# Patient Record
Sex: FEMALE | Race: WHITE
[De-identification: names, ages, dates, MRNs, and addresses within clinical notes are randomized per-mention and may not be internally consistent; named-entity substitution may affect disease eponyms.]

---

## 2018-02-26 ENCOUNTER — HOSPITAL ENCOUNTER (OUTPATIENT)
Dept: HOSPITAL 92 - BICMAMMO | Age: 48
Discharge: HOME | End: 2018-02-26
Attending: FAMILY MEDICINE
Payer: COMMERCIAL

## 2018-02-26 DIAGNOSIS — Z80.3: ICD-10-CM

## 2018-02-26 DIAGNOSIS — Z12.31: Primary | ICD-10-CM

## 2018-02-26 PROCEDURE — 77063 BREAST TOMOSYNTHESIS BI: CPT

## 2018-02-26 PROCEDURE — 77067 SCR MAMMO BI INCL CAD: CPT

## 2019-04-11 ENCOUNTER — HOSPITAL ENCOUNTER (OUTPATIENT)
Dept: HOSPITAL 92 - BICMAMMO | Age: 49
Discharge: HOME | End: 2019-04-11
Attending: FAMILY MEDICINE
Payer: COMMERCIAL

## 2019-04-11 DIAGNOSIS — Z12.31: Primary | ICD-10-CM

## 2019-04-11 DIAGNOSIS — N63.10: ICD-10-CM

## 2019-04-11 DIAGNOSIS — Z80.3: ICD-10-CM

## 2019-04-11 PROCEDURE — 77067 SCR MAMMO BI INCL CAD: CPT

## 2019-04-11 PROCEDURE — 77063 BREAST TOMOSYNTHESIS BI: CPT

## 2019-04-11 NOTE — MMO
Bilateral MAMMO Bilat Screen [facility].

 

CLINICAL HISTORY:

Patient is 49 years old and is seen for screening. The patient has the following

family history of breast cancer:  paternal grandmother, at age 80 and mother, at

age 34.  The patient has no personal history of cancer.

 

VIEWS:

The views performed were:  bilateral craniocaudal with tomosynthesis and

bilateral mediolateral oblique with tomosynthesis.

 

FILMS COMPARED:

The present examination has been compared to prior imaging studies performed at

[facility] on 10/11/2012, 04/17/2014, 07/28/2015, 08/05/2016 and 02/26/2018.

 

MAMMOGRAM FINDINGS:

There are scattered fibroglandular densities.

 

There is a new mass measuring 7 x 12 mm with circumscribed margins seen in the

middle region of the right breast at 12 o'clock.

 

In the left breast, there are no suspicious masses, calcifications or areas of

architectural distortion.

 

IMPRESSION:

NEW MASS IN THE RIGHT BREAST REQUIRES ADDITIONAL EVALUATION. ADDITIONAL

PROJECTIONS ARE RECOMMENDED. AN ULTRASOUND EXAM IS RECOMMENDED. ADDITIONAL

IMAGING.

 

THE RESULTS OF THIS EXAM WERE SENT TO THE PATIENT.

 

ACR BI-RADS Category 0 - Incomplete:  Need additional imaging evaluation. [facility] will notify the patient of the need for additional imaging services.

 

MAMMOGRAPHY NOTE:

 1. A negative mammogram report should not delay a biopsy if a dominant of

 clinically suspicious mass is present.

 2. Approximately 10% to 15% of breast cancers are not detected by

 mammography.

 3. Adenosis and dense breasts may obscure an underlying neoplasm.

## 2019-04-17 ENCOUNTER — HOSPITAL ENCOUNTER (OUTPATIENT)
Dept: HOSPITAL 92 - BICMAMMO | Age: 49
Discharge: HOME | End: 2019-04-17
Attending: FAMILY MEDICINE
Payer: COMMERCIAL

## 2019-04-17 DIAGNOSIS — N60.01: ICD-10-CM

## 2019-04-17 DIAGNOSIS — N63.10: Primary | ICD-10-CM

## 2019-04-17 PROCEDURE — G0279 TOMOSYNTHESIS, MAMMO: HCPCS

## 2019-04-17 NOTE — ULT
DIAGNOSTIC RIGHT BREAST ULTRASOUND:

 

INDICATIONS:

Mammographic mass.  Follow-up imaging.

 

TECHNIQUE:

Exam is performed in conjunction with diagnostic mammography.

 

FINDINGS:

There is a circumscribed, oval, anechoic focus with through transmission of sound at the 12 o'clock l
ocation of the right breast, which does correspond to a mammographic mass.  This measures 8 mm.  Dopp
ler evaluation is performed, which does not reveal evidence of internal flow.

 

IMPRESSION:

BI-RADS 2-Benign findings.  Benign cyst is present at the 12 o'clock location of the right breast, ac
counting for mammographic mass.

 

Recommend follow-up annual screening mammography.

 

POS: OFF

## 2020-06-09 ENCOUNTER — HOSPITAL ENCOUNTER (OUTPATIENT)
Dept: HOSPITAL 92 - LABBT | Age: 50
Discharge: HOME | End: 2020-06-09
Attending: ORTHOPAEDIC SURGERY
Payer: COMMERCIAL

## 2020-06-09 DIAGNOSIS — Z11.59: ICD-10-CM

## 2020-06-09 DIAGNOSIS — M75.02: ICD-10-CM

## 2020-06-09 DIAGNOSIS — Z01.818: Primary | ICD-10-CM

## 2020-06-09 LAB
BASOPHILS # BLD AUTO: 0 THOU/UL (ref 0–0.2)
BASOPHILS NFR BLD AUTO: 0.6 % (ref 0–1)
EOSINOPHIL # BLD AUTO: 0.1 THOU/UL (ref 0–0.7)
EOSINOPHIL NFR BLD AUTO: 1.8 % (ref 0–10)
HGB BLD-MCNC: 13.7 G/DL (ref 12–16)
LYMPHOCYTES # BLD: 1.9 THOU/UL (ref 1.2–3.4)
LYMPHOCYTES NFR BLD AUTO: 30.3 % (ref 21–51)
MCH RBC QN AUTO: 30.5 PG (ref 27–31)
MCV RBC AUTO: 91.1 FL (ref 78–98)
MONOCYTES # BLD AUTO: 0.5 THOU/UL (ref 0.11–0.59)
MONOCYTES NFR BLD AUTO: 7.9 % (ref 0–10)
NEUTROPHILS # BLD AUTO: 3.8 THOU/UL (ref 1.4–6.5)
NEUTROPHILS NFR BLD AUTO: 59.5 % (ref 42–75)
PLATELET # BLD AUTO: 217 THOU/UL (ref 130–400)
RBC # BLD AUTO: 4.49 MILL/UL (ref 4.2–5.4)
WBC # BLD AUTO: 6.3 THOU/UL (ref 4.8–10.8)

## 2020-06-09 PROCEDURE — 85025 COMPLETE CBC W/AUTO DIFF WBC: CPT

## 2020-06-09 PROCEDURE — U0003 INFECTIOUS AGENT DETECTION BY NUCLEIC ACID (DNA OR RNA); SEVERE ACUTE RESPIRATORY SYNDROME CORONAVIRUS 2 (SARS-COV-2) (CORONAVIRUS DISEASE [COVID-19]), AMPLIFIED PROBE TECHNIQUE, MAKING USE OF HIGH THROUGHPUT TECHNOLOGIES AS DESCRIBED BY CMS-2020-01-R: HCPCS

## 2020-06-09 PROCEDURE — 87635 SARS-COV-2 COVID-19 AMP PRB: CPT

## 2020-06-11 ENCOUNTER — HOSPITAL ENCOUNTER (OUTPATIENT)
Dept: HOSPITAL 92 - SDC | Age: 50
Discharge: HOME | End: 2020-06-11
Attending: ORTHOPAEDIC SURGERY
Payer: COMMERCIAL

## 2020-06-11 DIAGNOSIS — M75.41: ICD-10-CM

## 2020-06-11 DIAGNOSIS — Z79.899: ICD-10-CM

## 2020-06-11 DIAGNOSIS — M75.02: Primary | ICD-10-CM

## 2020-06-11 DIAGNOSIS — G89.18: ICD-10-CM

## 2020-06-11 DIAGNOSIS — Z88.2: ICD-10-CM

## 2020-06-11 NOTE — OP
DATE OF PROCEDURE:  06/11/2020



PREOPERATIVE DIAGNOSES:  Left shoulder adhesive capsulitis, left shoulder

subacromial impingement. 



POSTOPERATIVE DIAGNOSES:  

1. Left shoulder adhesive capsulitis.

2. Left shoulder subacromial impingement syndrome.



PROCEDURES PERFORMED:  

1. Examination under anesthesia with closed manipulation and release of adhesions.

2. Diagnostic arthroscopy.

3. Extensive arthroscopic debridement of the left shoulder.

4. Arthroscopic subacromial decompression acromioplasty.



ANESTHESIA:  General with interscalene block.



BLOOD LOSS:  Minimal.



DRAINS:  None.



COMPLICATIONS:  None.



DESCRIPTION OF PROCEDURE:  Following induction of general anesthesia, the patient

was placed supine on the operating room table.  Left shoulder was carefully

examined, noted to have abduction of 45 degrees and flexion of 60 degrees.  Internal

and external rotation were markedly restricted.  The closed manipulation was

performed gently with flexion of the shoulder, adduction of the shoulder, and

abduction in external rotation.  A full unrestricted range of motion of the shoulder

was obtained following release of adhesions resulted in audible and palpable release

of adhesions.  The patient was then placed in a right lateral decubitus position on

the operating room table.  Left shoulder was placed in arm holding device using 10

pounds of longitudinal traction.  Left shoulder was then prepped and draped in a

sterile fashion for surgery on the left shoulder, and arthroscope was inserted

through a posterior portal and the shoulder joint was examined.  The intraarticular

aspect of the shoulder demonstrated normal articular cartilage of the glenohumeral

joint.  There was noted to be marked erythema of the capsule surrounding the

shoulder.  The biceps tendon had marked erythema.  The manipulation had resulted in

release of adhesions from the anterior, inferior, and posterior capsule.  Through an

anterior portal, the glenohumeral joint was debrided extensively using motorized

shaver.  The undersurface of the rotator cuff was completely normal.  After

extensive debridement, the arthroscope was inserted in the subacromial space.  There

was noted to be marked bursal operatory in subacromial space.  Anterolateral portal

was established, and the motorized shaver was used to debride the adhesions in the

subacromial space.  Extensive bursectomy was performed.  There was noted to be

lateral downsloping acromion and impingement on the superficial surface of rotator

cuff.  There was marked superficial fraying of the rotator cuff.  No evidence of

tearing requiring repair and acromioplasty was performed by releasing the

coracoacromial ligament.  Using electrocautery, hemostasis was maintained after

release of the CA ligament and acromioplasty was performed using a motorized shaver

from anterior to posterior in an oblique fashion, removing approximately 4 to 5 mm

in anterolateral aspect of the acromion.  A smooth/subacromial surface was obtained,

and the subacromial space was well decompressed.  The bursal tissue and excess

tissue were debrided.  The arthroscope was then reinserted into the glenohumeral

joint, and the glenohumeral joint was inflated using a saline.  The saline was

removed.  An 18-gauge spinal needle was placed in the glenohumeral joint under

direct arthroscopic visualization.  The arthroscopic equipment was removed, and 8 mg

of dexamethasone was inserted into the glenohumeral joint.  The wounds were closed

using staples.  A large compressive sterile dressing was applied.  The patient

tolerated the procedure well, was taken to recovery room in stable condition. 







Job ID:  [id number]

## 2021-09-29 ENCOUNTER — HOSPITAL ENCOUNTER (OUTPATIENT)
Dept: HOSPITAL 92 - BICMAMMO | Age: 51
Discharge: HOME | End: 2021-09-29
Attending: FAMILY MEDICINE
Payer: COMMERCIAL

## 2021-09-29 DIAGNOSIS — Z80.3: ICD-10-CM

## 2021-09-29 DIAGNOSIS — Z12.31: Primary | ICD-10-CM

## 2021-09-29 PROCEDURE — 77067 SCR MAMMO BI INCL CAD: CPT

## 2021-09-29 PROCEDURE — 77063 BREAST TOMOSYNTHESIS BI: CPT

## 2022-09-30 ENCOUNTER — HOSPITAL ENCOUNTER (OUTPATIENT)
Dept: HOSPITAL 92 - BICMAMMO | Age: 52
Discharge: HOME | End: 2022-09-30
Attending: FAMILY MEDICINE
Payer: COMMERCIAL

## 2022-09-30 DIAGNOSIS — M81.0: ICD-10-CM

## 2022-09-30 DIAGNOSIS — N95.9: ICD-10-CM

## 2022-09-30 DIAGNOSIS — Z12.31: Primary | ICD-10-CM

## 2022-09-30 DIAGNOSIS — Z13.820: ICD-10-CM

## 2022-09-30 DIAGNOSIS — Z80.3: ICD-10-CM

## 2022-09-30 PROCEDURE — 77063 BREAST TOMOSYNTHESIS BI: CPT

## 2022-09-30 PROCEDURE — 77067 SCR MAMMO BI INCL CAD: CPT

## 2022-09-30 PROCEDURE — 77080 DXA BONE DENSITY AXIAL: CPT

## 2023-10-18 ENCOUNTER — HOSPITAL ENCOUNTER (OUTPATIENT)
Dept: HOSPITAL 92 - CSHMAMMO | Age: 53
Discharge: HOME | End: 2023-10-18
Attending: FAMILY MEDICINE
Payer: COMMERCIAL

## 2023-10-18 DIAGNOSIS — Z12.31: Primary | ICD-10-CM

## 2023-10-18 DIAGNOSIS — Z80.3: ICD-10-CM

## 2023-10-18 PROCEDURE — 77067 SCR MAMMO BI INCL CAD: CPT

## 2023-10-18 PROCEDURE — 77063 BREAST TOMOSYNTHESIS BI: CPT

## 2023-10-20 ENCOUNTER — HOSPITAL ENCOUNTER (OUTPATIENT)
Dept: HOSPITAL 92 - BICMAMMO | Age: 53
Discharge: HOME | End: 2023-10-20
Attending: FAMILY MEDICINE
Payer: COMMERCIAL

## 2023-10-20 DIAGNOSIS — M81.0: Primary | ICD-10-CM

## 2023-10-20 DIAGNOSIS — M85.852: ICD-10-CM

## 2023-10-20 DIAGNOSIS — M85.851: ICD-10-CM

## 2023-10-20 PROCEDURE — 77080 DXA BONE DENSITY AXIAL: CPT

## 2024-10-21 ENCOUNTER — HOSPITAL ENCOUNTER (OUTPATIENT)
Dept: HOSPITAL 92 - CSHMAMMO | Age: 54
Discharge: HOME | End: 2024-10-21
Attending: FAMILY MEDICINE
Payer: COMMERCIAL

## 2024-10-21 DIAGNOSIS — Z80.3: ICD-10-CM

## 2024-10-21 DIAGNOSIS — Z12.31: Primary | ICD-10-CM

## 2024-10-21 PROCEDURE — 77067 SCR MAMMO BI INCL CAD: CPT

## 2024-10-21 PROCEDURE — 77063 BREAST TOMOSYNTHESIS BI: CPT
